# Patient Record
Sex: MALE | Race: BLACK OR AFRICAN AMERICAN | NOT HISPANIC OR LATINO | Employment: OTHER | URBAN - METROPOLITAN AREA
[De-identification: names, ages, dates, MRNs, and addresses within clinical notes are randomized per-mention and may not be internally consistent; named-entity substitution may affect disease eponyms.]

---

## 2021-09-14 ENCOUNTER — HISTORICAL (OUTPATIENT)
Dept: ADMINISTRATIVE | Facility: HOSPITAL | Age: 61
End: 2021-09-14

## 2021-11-23 ENCOUNTER — HISTORICAL (OUTPATIENT)
Dept: ADMINISTRATIVE | Facility: HOSPITAL | Age: 61
End: 2021-11-23

## 2022-04-07 ENCOUNTER — HISTORICAL (OUTPATIENT)
Dept: ADMINISTRATIVE | Facility: HOSPITAL | Age: 62
End: 2022-04-07

## 2022-04-23 VITALS
BODY MASS INDEX: 25.21 KG/M2 | WEIGHT: 170.19 LBS | DIASTOLIC BLOOD PRESSURE: 61 MMHG | HEIGHT: 69 IN | SYSTOLIC BLOOD PRESSURE: 123 MMHG

## 2024-03-18 ENCOUNTER — HOSPITAL ENCOUNTER (EMERGENCY)
Facility: HOSPITAL | Age: 64
Discharge: HOME | End: 2024-03-18
Attending: INTERNAL MEDICINE
Payer: MEDICAID

## 2024-03-18 VITALS
RESPIRATION RATE: 16 BRPM | BODY MASS INDEX: 25.77 KG/M2 | DIASTOLIC BLOOD PRESSURE: 89 MMHG | SYSTOLIC BLOOD PRESSURE: 175 MMHG | OXYGEN SATURATION: 98 % | WEIGHT: 174 LBS | HEIGHT: 69 IN | TEMPERATURE: 98 F | HEART RATE: 82 BPM

## 2024-03-18 DIAGNOSIS — S90.424A BLISTER OF FIFTH TOE OF RIGHT FOOT, INITIAL ENCOUNTER: Primary | ICD-10-CM

## 2024-03-18 DIAGNOSIS — H54.3 BLINDNESS OF BOTH EYES: ICD-10-CM

## 2024-03-18 DIAGNOSIS — Z86.39 HISTORY OF DIABETES MELLITUS, TYPE II: ICD-10-CM

## 2024-03-18 DIAGNOSIS — H26.9 CATARACT OF BOTH EYES, UNSPECIFIED CATARACT TYPE: ICD-10-CM

## 2024-03-18 LAB — POCT GLUCOSE: 121 MG/DL (ref 70–110)

## 2024-03-18 PROCEDURE — 82962 GLUCOSE BLOOD TEST: CPT

## 2024-03-18 PROCEDURE — 99283 EMERGENCY DEPT VISIT LOW MDM: CPT | Mod: 25

## 2024-03-18 NOTE — ED PROVIDER NOTES
Encounter Date: 3/18/2024       History     Chief Complaint   Patient presents with    Wound Check     Patient is a diabetic, has wound to right 5th toe seen in [place] ED 2 days ago and he was told to come to ED to get set up with wound care, right pedal pulse present. Also reports needs to see an eye doctor regarding right eye painful since last week     63-year-old male with past medical history significant for end-stage renal disease on dialysis, diabetes, hypertension, hyperlipidemia, smoking, LLE amputation, cataracts, glaucoma and blindness presents to ED requesting referral to wound care and Ophthalmology clinics.  Patient reports he has no feeling in his right foot, but reports his wife recently noticed a wound to lateral little toe.  Patient went to [facility] and him in his wife and daughter report that he had an MRI done and they are not sure of the results, but were told there was no infection and that he needed to come here to [facility] to follow up with wound care.  Patient denies any pain in toe, but again reports he has no feeling in his feet.  Denies fever, chills, nausea, vomiting, generalized weakness, fatigue, body aches, appetite change, lower extremity edema, purulent drainage.  Patient also reports having pain in his right eye one-week ago and states he needs to be seen by an eye doctor.  Patient reports he has been blind for approximately 3 years and denies any recent change in vision.  Denies eye pain currently.  Denies eye drainage or headache.  Vital signs stable on arrival, patient in no acute distress.      Review of patient's allergies indicates:   Allergen Reactions    Bactrim [sulfamethoxazole-trimethoprim] Rash     No past medical history on file.  No past surgical history on file.  No family history on file.     Review of Systems   All other systems reviewed and are negative.      Physical Exam     Initial Vitals [03/18/24 0931]   BP Pulse Resp Temp SpO2   (!) 175/82 84 16 98  °F (36.7 °C) 97 %      MAP       --         Physical Exam    Nursing note and vitals reviewed.  Constitutional: He appears well-developed and well-nourished. No distress.   HENT:   Head: Normocephalic and atraumatic.   Eyes: Conjunctivae and lids are normal. Pupils are equal, round, and reactive to light.   Cataracts bilaterally   Neck: Neck supple.   Normal range of motion.  Cardiovascular:  Normal rate, regular rhythm, normal heart sounds and intact distal pulses.     Exam reveals no gallop and no friction rub.       No murmur heard.  Pulmonary/Chest: Breath sounds normal. No respiratory distress. He has no wheezes. He has no rhonchi. He has no rales.   Abdominal: Abdomen is soft. Bowel sounds are normal. He exhibits no distension. There is no abdominal tenderness. There is no rebound and no guarding.   Musculoskeletal:         General: Normal range of motion.      Cervical back: Normal range of motion and neck supple.      Right lower leg: Normal.      Right ankle: Normal.      Right foot: Normal range of motion and normal capillary refill. No swelling, tenderness or bony tenderness. Normal pulse.        Feet:       Comments: Left-sided AKA.      Left Lower Extremity: Left leg is amputated above knee.     Neurological: He is alert and oriented to person, place, and time. No cranial nerve deficit.   Skin: Skin is warm and dry.   Psychiatric: He has a normal mood and affect.         ED Course   Procedures  Labs Reviewed - No data to display       Imaging Results              X-Ray Foot Complete Right (Preliminary result)  Result time 03/18/24 13:10:29      Wet Read by Eugene Moreland PA (03/18/24 13:10:29, [facility], Emergency Medicine)    No evidence of osteomyelitis to little toe. Degenerative changes and vascular calcifications noted.                                     Medications - No data to display  Medical Decision Making  Differential diagnosis: Includes but not limited to  osteomyelitis, diabetic foot wound, peripheral neuropathy    ED management:  No indication for acute intervention in ED at this time.      ED course:  There is no evidence of osteomyelitis on x-ray.  There is no evidence of infection on exam and patient denies chronic nature of wound to right little toe.  Patient also had an MRI recently which seems to have shown no evidence of osteomyelitis.  No indication for further workup in the ED at this time, but I have placed referral to [facility] wound clinic and given strict return precautions.  Patient denies any eye symptoms currently aside from chronic blindness, no indication for further workup of this at this time, but I have placed referral to ophthalmology clinic.  Again, strict ED precautions given and patient verbalized understanding.  All test results explained and all questions answered.    Amount and/or Complexity of Data Reviewed  Radiology: ordered and independent interpretation performed. Decision-making details documented in ED Course.                                      Clinical Impression:  Final diagnoses:  [S90.424A] Blister of fifth toe of right foot, initial encounter (Primary)  [Z86.39] History of diabetes mellitus, type II  [H26.9] Cataract of both eyes, unspecified cataract type  [H54.3] Blindness of both eyes          ED Disposition Condition    Discharge Good          ED Prescriptions    None       Follow-up Information       Follow up With Specialties Details Why Contact Info    [facility] Wound Care In 3 days  [address] [address] [phone]    [facility] Ophthalmology In 2 weeks  [address] [place] [id number] [phone]    [facility] Emergency Medicine  As needed, If symptoms worsen [address] [address] [phone]             Eugene Moreland PA  03/18/24 2487